# Patient Record
Sex: FEMALE | Race: WHITE | Employment: UNEMPLOYED | URBAN - METROPOLITAN AREA
[De-identification: names, ages, dates, MRNs, and addresses within clinical notes are randomized per-mention and may not be internally consistent; named-entity substitution may affect disease eponyms.]

---
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
## 2025-05-06 DIAGNOSIS — M25.531 RIGHT WRIST PAIN: Primary | ICD-10-CM

## 2025-05-08 ENCOUNTER — OFFICE VISIT (OUTPATIENT)
Dept: ORTHOPEDIC SURGERY | Age: 25
End: 2025-05-08
Payer: COMMERCIAL

## 2025-05-08 VITALS — HEIGHT: 60 IN | BODY MASS INDEX: 20.03 KG/M2 | WEIGHT: 102 LBS

## 2025-05-08 DIAGNOSIS — M25.331 SCAPHOLUNATE DISSOCIATION OF RIGHT WRIST: Primary | ICD-10-CM

## 2025-05-08 PROCEDURE — 99203 OFFICE O/P NEW LOW 30 MIN: CPT | Performed by: ORTHOPAEDIC SURGERY

## 2025-05-08 PROCEDURE — 4004F PT TOBACCO SCREEN RCVD TLK: CPT | Performed by: ORTHOPAEDIC SURGERY

## 2025-05-08 PROCEDURE — G8420 CALC BMI NORM PARAMETERS: HCPCS | Performed by: ORTHOPAEDIC SURGERY

## 2025-05-08 PROCEDURE — G8427 DOCREV CUR MEDS BY ELIG CLIN: HCPCS | Performed by: ORTHOPAEDIC SURGERY

## 2025-05-08 NOTE — PROGRESS NOTES
Chief Complaint   Patient presents with    Wrist Pain     25 year old right hand dominant female presents with complaints of right wrist pain for a couple years. Pain and stiffness trying to bed it. Denies numbness or tingling. Intermittent use of OTC velcro wrist brace. No other treatments to date.        Sam Berrios is a 25 y.o. year old  female who presents for evaluation of right wrist pain.  she reports this started a few years ago.  she does not remember a specific injury that started the pain.  She complains of limited movement in the wrist and stiffness.  The injury was none. The pain is worse with movement and better with rest.  The patient has tried OTC analgesics, brace .  The treatment has not been effective.  The patient is right dominant.  The patient is employed at in retail.    Past Medical History:   Diagnosis Date    Pneumonia     Thyroid disease      No past surgical history on file.    Current Outpatient Medications:     vitamin D (CHOLECALCIFEROL) 1000 UNIT TABS tablet, Take 1 tablet by mouth daily, Disp: , Rfl:     levothyroxine (SYNTHROID) 100 MCG tablet, Take 1 tablet by mouth Daily 88 mcg on the weekend, Disp: , Rfl:     Dextromethorphan-Guaifenesin (MUCINEX DM MAXIMUM STRENGTH)  MG TB12, Take 1 tablet by mouth every 12 hours as needed (cough), Disp: 12 tablet, Rfl: 0  Allergies   Allergen Reactions    Penicillins Hives    Zithromax [Azithromycin]      Social History     Socioeconomic History    Marital status: Single     Spouse name: Not on file    Number of children: Not on file    Years of education: Not on file    Highest education level: Not on file   Occupational History    Not on file   Tobacco Use    Smoking status: Passive Smoke Exposure - Never Smoker    Smokeless tobacco: Never   Substance and Sexual Activity    Alcohol use: No    Drug use: No    Sexual activity: Not on file   Other Topics Concern    Not on file   Social History Narrative    Not on file

## 2025-05-23 ENCOUNTER — HOSPITAL ENCOUNTER (OUTPATIENT)
Dept: MRI IMAGING | Age: 25
Discharge: HOME | End: 2025-05-25
Payer: COMMERCIAL

## 2025-05-23 DIAGNOSIS — M25.331 SCAPHOLUNATE DISSOCIATION OF RIGHT WRIST: ICD-10-CM

## 2025-05-23 PROCEDURE — 73221 MRI JOINT UPR EXTREM W/O DYE: CPT
